# Patient Record
(demographics unavailable — no encounter records)

---

## 2025-03-17 NOTE — HISTORY OF PRESENT ILLNESS
[FreeTextEntry1] : 37yo  here for annual and vulvovaginitis/discharge. sexually active 1 male partner, recently tested for STI in jan. this has been 1-2 months of foul smelling urine and minimal discharge. no pain ro AUB. regular menses. Reports as well severe anxiety and symptoms of depression for months now since beginning of winter at least. PHQ9 +, no SI or HI. Desires therapy will arrange.  [facility] denies psh D&C, breast reduction meds none all nkda  obhx SAB x 1 gynhx unremarkable